# Patient Record
Sex: FEMALE | Race: BLACK OR AFRICAN AMERICAN
[De-identification: names, ages, dates, MRNs, and addresses within clinical notes are randomized per-mention and may not be internally consistent; named-entity substitution may affect disease eponyms.]

---

## 2019-04-20 ENCOUNTER — HOSPITAL ENCOUNTER (EMERGENCY)
Dept: HOSPITAL 72 - EMR | Age: 49
Discharge: HOME | End: 2019-04-20
Payer: MEDICAID

## 2019-04-20 VITALS — BODY MASS INDEX: 38.51 KG/M2 | WEIGHT: 260 LBS | HEIGHT: 69 IN

## 2019-04-20 VITALS — SYSTOLIC BLOOD PRESSURE: 115 MMHG | DIASTOLIC BLOOD PRESSURE: 78 MMHG

## 2019-04-20 VITALS — DIASTOLIC BLOOD PRESSURE: 80 MMHG | SYSTOLIC BLOOD PRESSURE: 121 MMHG

## 2019-04-20 VITALS — SYSTOLIC BLOOD PRESSURE: 121 MMHG | DIASTOLIC BLOOD PRESSURE: 69 MMHG

## 2019-04-20 DIAGNOSIS — F20.9: ICD-10-CM

## 2019-04-20 DIAGNOSIS — R00.2: Primary | ICD-10-CM

## 2019-04-20 DIAGNOSIS — F32.9: ICD-10-CM

## 2019-04-20 DIAGNOSIS — I10: ICD-10-CM

## 2019-04-20 DIAGNOSIS — R00.0: ICD-10-CM

## 2019-04-20 LAB
ADD MANUAL DIFF: NO
ALBUMIN SERPL-MCNC: 4.3 G/DL (ref 3.4–5)
ALBUMIN/GLOB SERPL: 1.1 {RATIO} (ref 1–2.7)
ALP SERPL-CCNC: 105 U/L (ref 46–116)
ALT SERPL-CCNC: 73 U/L (ref 12–78)
ANION GAP SERPL CALC-SCNC: 12 MMOL/L (ref 5–15)
APPEARANCE UR: CLEAR
APTT PPP: (no result) S
AST SERPL-CCNC: 43 U/L (ref 15–37)
BASOPHILS NFR BLD AUTO: 1.6 % (ref 0–2)
BILIRUB SERPL-MCNC: 0.5 MG/DL (ref 0.2–1)
BUN SERPL-MCNC: 19 MG/DL (ref 7–18)
CALCIUM SERPL-MCNC: 9.7 MG/DL (ref 8.5–10.1)
CHLORIDE SERPL-SCNC: 101 MMOL/L (ref 98–107)
CK MB SERPL-MCNC: 2.6 NG/ML (ref 0–3.6)
CK SERPL-CCNC: 258 U/L (ref 26–308)
CO2 SERPL-SCNC: 25 MMOL/L (ref 21–32)
CREAT SERPL-MCNC: 0.9 MG/DL (ref 0.55–1.3)
EOSINOPHIL NFR BLD AUTO: 2.2 % (ref 0–3)
ERYTHROCYTE [DISTWIDTH] IN BLOOD BY AUTOMATED COUNT: 13.1 % (ref 11.6–14.8)
GLOBULIN SER-MCNC: 4 G/DL
GLUCOSE UR STRIP-MCNC: NEGATIVE MG/DL
HCT VFR BLD CALC: 41.6 % (ref 37–47)
HGB BLD-MCNC: 14.4 G/DL (ref 12–16)
KETONES UR QL STRIP: NEGATIVE
LEUKOCYTE ESTERASE UR QL STRIP: NEGATIVE
LYMPHOCYTES NFR BLD AUTO: 23.2 % (ref 20–45)
MCV RBC AUTO: 82 FL (ref 80–99)
MONOCYTES NFR BLD AUTO: 7.5 % (ref 1–10)
NEUTROPHILS NFR BLD AUTO: 65.6 % (ref 45–75)
NITRITE UR QL STRIP: NEGATIVE
PH UR STRIP: 5 [PH] (ref 4.5–8)
PLATELET # BLD: 214 K/UL (ref 150–450)
POTASSIUM SERPL-SCNC: 4.5 MMOL/L (ref 3.5–5.1)
PROT UR QL STRIP: NEGATIVE
RBC # BLD AUTO: 5.07 M/UL (ref 4.2–5.4)
SODIUM SERPL-SCNC: 138 MMOL/L (ref 136–145)
SP GR UR STRIP: 1.01 (ref 1–1.03)
UROBILINOGEN UR-MCNC: NORMAL MG/DL (ref 0–1)
WBC # BLD AUTO: 6.8 K/UL (ref 4.8–10.8)

## 2019-04-20 PROCEDURE — 93005 ELECTROCARDIOGRAM TRACING: CPT

## 2019-04-20 PROCEDURE — 84439 ASSAY OF FREE THYROXINE: CPT

## 2019-04-20 PROCEDURE — 85025 COMPLETE CBC W/AUTO DIFF WBC: CPT

## 2019-04-20 PROCEDURE — 80307 DRUG TEST PRSMV CHEM ANLYZR: CPT

## 2019-04-20 PROCEDURE — 82553 CREATINE MB FRACTION: CPT

## 2019-04-20 PROCEDURE — 80053 COMPREHEN METABOLIC PANEL: CPT

## 2019-04-20 PROCEDURE — 82550 ASSAY OF CK (CPK): CPT

## 2019-04-20 PROCEDURE — 81003 URINALYSIS AUTO W/O SCOPE: CPT

## 2019-04-20 PROCEDURE — 84443 ASSAY THYROID STIM HORMONE: CPT

## 2019-04-20 PROCEDURE — 99284 EMERGENCY DEPT VISIT MOD MDM: CPT

## 2019-04-20 PROCEDURE — 71045 X-RAY EXAM CHEST 1 VIEW: CPT

## 2019-04-20 PROCEDURE — 36415 COLL VENOUS BLD VENIPUNCTURE: CPT

## 2019-04-20 PROCEDURE — 83690 ASSAY OF LIPASE: CPT

## 2019-04-20 PROCEDURE — 84484 ASSAY OF TROPONIN QUANT: CPT

## 2019-04-20 NOTE — EMERGENCY ROOM REPORT
History of Present Illness


General


Chief Complaint:  Palpitations


Source:  Patient





Present Illness


HPI


Patient present with complaints of palpitation sensation


She reports that she has had these for extended period time however today she 

felt more outpatient sensation she reports that her psychiatrist told her to 

get checked by her primary physician


This was in March


However she has not had a chance to





Patient also seeing her primary physician monthly





Denies any chest pain with it denies any vomiting or diarrhea





Denies any change in medications recently denies any fevers


Patient reports thyroid medical condition


Allergies:  


Coded Allergies:  


     NO KNOWN ALLERGIES (Unverified  Allergy, Unknown, 12/30/15)





Patient History


Past Medical History:  see triage record


Pertinent Family History:  none


Last Menstrual Period:  1 year ago


Reviewed Nursing Documentation:  PMH: Agreed; PSxH: Agreed





Nursing Documentation-PMH


Past Medical History:  No History, Except For


Hx Hypertension:  Yes


History Of Psychiatric Problem:  Yes - depression, schizophrenia





Review of Systems


All Other Systems:  negative except mentioned in HPI





Physical Exam





Vital Signs








  Date Time  Temp Pulse Resp B/P (MAP) Pulse Ox O2 Delivery O2 Flow Rate FiO2


 


4/20/19 10:14 97.0 120 18 131/86 99 Room Air  








Sp02 EP Interpretation:  reviewed, normal


General Appearance:  well appearing, no apparent distress


Head:  normocephalic, atraumatic


Eyes:  bilateral eye PERRL, bilateral eye EOMI


ENT:  hearing grossly normal, normal pharynx, TMs + canals normal, uvula midline


Neck:  full range of motion, supple, no meningismus, no bony tend


Respiratory:  lungs clear, normal breath sounds, no rhonchi, no respiratory 

distress, no retraction, no accessory muscle use


Cardiovascular #1:  normal peripheral pulses, no edema, no gallop, no JVD, no 

murmur, tachycardia - 110 beats a minute


Gastrointestinal:  normal bowel sounds, non tender, soft, no mass, no 

organomegaly, non-distended, no guarding, no hernia, no pulsatile mass, no 

rebound


Genitourinary:  no CVA tenderness


Musculoskeletal:  normal inspection


Neurologic:  oriented x3, responsive, CNs III-XII nml as tested, motor strength/

tone normal, sensory intact


Psychiatric:  mood/affect normal


Skin:  normal color, no rash, warm/dry, palpation normal


Lymphatic:  normal inspection, no adenopathy





Medical Decision Making


Diagnostic Impression:  


 Primary Impression:  


 Palpitations


ER Course


Given the patient's history and presentation multiple differentials and 

consideration





Patient's EKG does show a mildly tachycardic finding with heart rate of 107





Extensive blood work including thyroid studies and cardiac studies initiated





All within normal limits





Patient is on multiple medications


I feel that she would benefit from evaluation of the medications provided by 

psychiatry and primary physician to evaluate further possible interaction





Patient also could potentially benefit from altered monitoring and close 

outpatient follow-up





Labs








Test


  4/20/19


10:30


 


White Blood Count


  6.8 K/UL


(4.8-10.8)


 


Red Blood Count


  5.07 M/UL


(4.20-5.40)


 


Hemoglobin


  14.4 G/DL


(12.0-16.0)


 


Hematocrit


  41.6 %


(37.0-47.0)


 


Mean Corpuscular Volume 82 FL (80-99) 


 


Mean Corpuscular Hemoglobin


  28.4 PG


(27.0-31.0)


 


Mean Corpuscular Hemoglobin


Concent 34.5 G/DL


(32.0-36.0)


 


Red Cell Distribution Width


  13.1 %


(11.6-14.8)


 


Platelet Count


  214 K/UL


(150-450)


 


Mean Platelet Volume


  8.4 FL


(6.5-10.1)


 


Neutrophils (%) (Auto)


  65.6 %


(45.0-75.0)


 


Lymphocytes (%) (Auto)


  23.2 %


(20.0-45.0)


 


Monocytes (%) (Auto)


  7.5 %


(1.0-10.0)


 


Eosinophils (%) (Auto)


  2.2 %


(0.0-3.0)


 


Basophils (%) (Auto)


  1.6 %


(0.0-2.0)


 


Urine Color Pale yellow 


 


Urine Appearance Clear 


 


Urine pH 5 (4.5-8.0) 


 


Urine Specific Gravity


  1.010


(1.005-1.035)


 


Urine Protein


  Negative


(NEGATIVE)


 


Urine Glucose (UA)


  Negative


(NEGATIVE)


 


Urine Ketones


  Negative


(NEGATIVE)


 


Urine Blood


  Negative


(NEGATIVE)


 


Urine Nitrite


  Negative


(NEGATIVE)


 


Urine Bilirubin


  Negative


(NEGATIVE)


 


Urine Urobilinogen


  Normal MG/DL


(0.0-1.0)


 


Urine Leukocyte Esterase


  Negative


(NEGATIVE)


 


Sodium Level


  138 MMOL/L


(136-145)


 


Potassium Level


  4.5 MMOL/L


(3.5-5.1)


 


Chloride Level


  101 MMOL/L


()


 


Carbon Dioxide Level


  25 MMOL/L


(21-32)


 


Anion Gap


  12 mmol/L


(5-15)


 


Blood Urea Nitrogen


  19 mg/dL


(7-18)


 


Creatinine


  0.9 MG/DL


(0.55-1.30)


 


Estimat Glomerular Filtration


Rate > 60 mL/min


(>60)


 


Glucose Level


  107 MG/DL


()


 


Calcium Level


  9.7 MG/DL


(8.5-10.1)


 


Total Bilirubin


  0.5 MG/DL


(0.2-1.0)


 


Aspartate Amino Transf


(AST/SGOT) 43 U/L (15-37) 


 


 


Alanine Aminotransferase


(ALT/SGPT) 73 U/L (12-78) 


 


 


Alkaline Phosphatase


  105 U/L


()


 


Total Creatine Kinase


  258 U/L


()


 


Creatine Kinase MB


  2.6 NG/ML


(0.0-3.6)


 


Creatine Kinase MB Relative


Index 1.0 


 


 


Troponin I


  0.000 ng/mL


(0.000-0.056)


 


Total Protein


  8.3 G/DL


(6.4-8.2)


 


Albumin


  4.3 G/DL


(3.4-5.0)


 


Globulin 4.0 g/dL 


 


Albumin/Globulin Ratio 1.1 (1.0-2.7) 


 


Lipase


  132 U/L


()


 


Thyroid Stimulating Hormone


(TSH) < 10.000


uiU/mL


 


Free Thyroxine


  0.94 NG/DL


(0.76-1.46)


 


Urine Opiates Screen


  Positive


(NEGATIVE)


 


Urine Barbiturates Screen


  Negative


(NEGATIVE)


 


Phencyclidine (PCP) Screen


  Negative


(NEGATIVE)


 


Urine Amphetamines Screen


  Negative


(NEGATIVE)


 


Urine Benzodiazepines Screen


  Negative


(NEGATIVE)


 


Urine Cocaine Screen


  Negative


(NEGATIVE)


 


Urine Marijuana (THC) Screen


  Negative


(NEGATIVE)








EKG Diagnostic Results


Rate:  tachycardiac


Rhythm:  other


ST Segments:  no acute changes





Rhythm Strip Diag. Results


EP Interpretation:  yes


Rate:  98


Rhythm:  NSR, no PVC's, no ectopy





Chest X-Ray Diagnostic Results


Chest X-Ray Diagnostic Results :  


   Chest X-Ray Ordered:  Yes


   # of Views/Limited/Complete:  1 View


   Indication:  Chest Pain


   EP Interpretation:  Yes


   Interpretation:  no consolidation, no effusion, no pneumothorax


   Impression:  No acute disease


   Electronically Signed by:  Stephen Geiger DO





Last Vital Signs








  Date Time  Temp Pulse Resp B/P (MAP) Pulse Ox O2 Delivery O2 Flow Rate FiO2


 


4/20/19 10:14 97.0 120 18 131/86 99 Room Air  








Status:  improved


Disposition:  HOME, SELF-CARE


Condition:  Improved





Additional Instructions:  


Patient is provided with the discharge instructions notified to follow up with 

primary doctor in the next 2-3 days otherwise return to the er with any 

worsening symptoms.


Please note that this report is being documented using DRAGON technology.  This 

can lead to erroneous entry secondary to incorrect interpretation by the 

dictating instrument.











Stephen Geiger DO Apr 20, 2019 10:45

## 2019-09-08 ENCOUNTER — HOSPITAL ENCOUNTER (EMERGENCY)
Dept: HOSPITAL 72 - EMR | Age: 49
Discharge: HOME | End: 2019-09-08
Payer: MEDICAID

## 2019-09-08 VITALS — SYSTOLIC BLOOD PRESSURE: 132 MMHG | DIASTOLIC BLOOD PRESSURE: 82 MMHG

## 2019-09-08 VITALS — HEIGHT: 68.5 IN | WEIGHT: 260 LBS | BODY MASS INDEX: 38.95 KG/M2

## 2019-09-08 VITALS — SYSTOLIC BLOOD PRESSURE: 128 MMHG | DIASTOLIC BLOOD PRESSURE: 80 MMHG
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
## 2019-09-08 DIAGNOSIS — I10: ICD-10-CM

## 2019-09-08 DIAGNOSIS — S40.011A: Primary | ICD-10-CM

## 2019-09-08 PROCEDURE — 99283 EMERGENCY DEPT VISIT LOW MDM: CPT

## 2019-09-08 PROCEDURE — 73030 X-RAY EXAM OF SHOULDER: CPT

## 2019-09-08 NOTE — EMERGENCY ROOM REPORT
History of Present Illness


General


Chief Complaint:  Upper Extremity Injury


Source:  Patient, Medical Record





Present Illness


HPI


Patient is a 49-year-old female presenting for right-sided shoulder pain for 

the past 1 month.  She admits to falling onto the shoulder but does not 

remember how.  Pain has continued and is a 9 out of 10 dull ache.  Does not 

radiate.  Worse with movement.  She denies previous shoulder injury.  She has 

been taking ibuprofen which has not been helping.  She denies other symptoms 

including neck pain or stiffness, headache, dizziness, chest pain, shortness of 

breath, numbness or tingling


Allergies:  


Coded Allergies:  


     NO KNOWN ALLERGIES (Unverified  Allergy, Unknown, 12/30/15)





Patient History


Past Medical History:  see triage record


Pertinent Family History:  none


Last Menstrual Period:  several yrs ago


Reviewed Nursing Documentation:  PMH: Agreed; PSxH: Agreed





Nursing Documentation-PMH


Past Medical History:  No History, Except For


Hx Hypertension:  Yes





Review of Systems


All Other Systems:  negative except mentioned in HPI





Physical Exam





Vital Signs








  Date Time  Temp Pulse Resp B/P (MAP) Pulse Ox O2 Delivery O2 Flow Rate FiO2


 


9/8/19 12:13 98.1 105 18 132/82 (99) 96 Room Air  








Sp02 EP Interpretation:  reviewed, normal


General Appearance:  no apparent distress, alert, GCS 15, non-toxic


Head:  normocephalic, atraumatic


Neck:  full range of motion, no bony tend, supple/symm/no masses


Respiratory:  chest non-tender, lungs clear, normal breath sounds, speaking 

full sentences


Cardiovascular #1:  regular rate, rhythm, no edema


Musculoskeletal:  tender - R trapezius


Neurologic:  alert, oriented x3, responsive, motor strength/tone normal, 

sensory intact, speech normal


Psychiatric:  judgement/insight normal, memory normal, mood/affect normal, no 

suicidal/homicidal ideation


Skin:  no rash





Procedures


Splinting


Splinting :  


   Consent:  Verbal


   Location:  R arm sling


   Pre-Made Type:  sling


   Pre-Proc Neuro Vasc Exam:  normal


   Post-Proc Neuro Vasc Exam:  normal


   Patient Tolerated:  Well





Medical Decision Making


PA Attestation


Dr. Alvarado is my supervising physician. Patient management was discussed 

with my supervising physician


Diagnostic Impression:  


 Primary Impression:  


 Shoulder contusion


 Qualified Codes:  S40.011A - Contusion of right shoulder, initial encounter


ER Course


Patient is a 49-year-old female presenting for right-sided shoulder pain for 

the past 1 month.





Ddx considered include but not limited to sprain/strain, fracture, contusion





PE: Vitals stable. NAD


Neck: soft and supple. No midline tenderness or step-offs


R shoulder: No obvious deformity.  Full active range of motion is intact.  

Strength 5/5.


Tenderness to palpation over the right trapezius only





Right sided sling is placed in the patient is discharged home with ibuprofen.  

She is told to follow-up with primary doctor as soon as possible for further 

treatment


ER precautions given


Other X-Ray Diagnostic Results


Other X-Ray Diagnostic Results :  


   # of Views/Limited Vs Complete:  3 View, Complete


   Indication:  Pain


   EP Interpretation:  Yes


   PA Xray:  Interpretation reviewed, by supervising MD, and agrees with 

findings.


   Interpretation:  no dislocation, no soft tissue swelling, no fractures


   Impression:  No acute disease


   Electronically Signed by:  Milan Hsu PA-C





Last Vital Signs








  Date Time  Temp Pulse Resp B/P (MAP) Pulse Ox O2 Delivery O2 Flow Rate FiO2


 


9/8/19 13:11 98.0 100 14 128/80 97 Room Air  








Status:  improved


Disposition:  HOME, SELF-CARE


Condition:  Improved


Scripts


Ibuprofen* (MOTRIN*) 600 Mg Tablet


600 MG ORAL Q8H PRN for For Pain, #30 TAB 0 Refills


   Prov: MILAN HSU         9/8/19


Referrals:  


NON PHYSICIAN (PCP)


Patient Instructions:  Shoulder Pain, Contusion





Additional Instructions:  


I discussed my findings with the patient. All questions and concerns have been 

answered. Treatment and medication compliance have been addressed. I advised 

the patient that they need to follow up with PMD in 3-5 days. Return to ED if 

pain remains or worsens, numbness or tingling occurs, new rash is noticed, 

fever is noticed, or if needed for any reason. Patient verbalized understanding 

of discharge instructions.











MILAN HSU Sep 8, 2019 21:56

## 2019-09-08 NOTE — NUR
ER DISCHARGE NOTE:



Patient is cleared to be discharged per GAYLE Yates, pt is aox4, on room air, with 
stable vital signs. pt was given dc and prescription instructions, pt was able 
to verbalize understanding, pt id band removed without complications. pt is 
able to ambulate with steady gait. pt took all belongings.

## 2019-09-08 NOTE — NUR
ED Nurse Note:

pt walked in to ED due to right shoulder pain that radiated to right arm.  no 
limited ROM noted.  per pt, tripped and fell on concrete about month ago.  no 
head injury.  AAO x4.  respirations even and non-labored noted.  will wait for 
the further order.

## 2019-09-09 NOTE — DIAGNOSTIC IMAGING REPORT
Indication: Right shoulder pain

 

Technique: 3 views of the right shoulder

 

Comparison: none

 

Findings: No acute fractures. No dislocations. Joint spaces are preserved. There are

mild degenerative changes of the acromioclavicular joint

 

Impression: No acute process

## 2019-10-27 ENCOUNTER — HOSPITAL ENCOUNTER (EMERGENCY)
Dept: HOSPITAL 72 - EMR | Age: 49
Discharge: HOME | End: 2019-10-27
Payer: MEDICAID

## 2019-10-27 VITALS — BODY MASS INDEX: 38.21 KG/M2 | HEIGHT: 69 IN | WEIGHT: 258 LBS

## 2019-10-27 VITALS — DIASTOLIC BLOOD PRESSURE: 84 MMHG | SYSTOLIC BLOOD PRESSURE: 129 MMHG

## 2019-10-27 VITALS — DIASTOLIC BLOOD PRESSURE: 84 MMHG | SYSTOLIC BLOOD PRESSURE: 123 MMHG

## 2019-10-27 DIAGNOSIS — I10: ICD-10-CM

## 2019-10-27 DIAGNOSIS — M75.51: Primary | ICD-10-CM

## 2019-10-27 PROCEDURE — 96372 THER/PROPH/DIAG INJ SC/IM: CPT

## 2019-10-27 PROCEDURE — 99283 EMERGENCY DEPT VISIT LOW MDM: CPT

## 2019-10-27 NOTE — NUR
ED Nurse Note:



Patient walked in to ER from home due to Rt arm pain 10/10 radiates to Rt 
shoulder. Patient alert and oriented x4 and ambulatory. Skin clean and intact. 
Calm and cooperative. No acute distress noted at this moment. per pt, she has 
had this pain for a month but it got worse last night.

## 2019-10-27 NOTE — EMERGENCY ROOM REPORT
History of Present Illness


General


Chief Complaint:  Pain


Source:  Patient





Present Illness


HPI


49-year-old female who is morbidly obese and no other significant past medical 

history here complaining of chronic right shoulder pain x1 and half months.  

Patient was here at [facility] beginning of September 2019 after a fall and was 

diagnosed with shoulder contusion since x-ray was within normal limits.  

Patient did follow with primary care however has not mentioned the shoulder 

pain to him yet.  Reports that starting last night she noticed increasing pain 

rating a 10 out of 10 with radiation to right forearm and fingers.  Denies 

tingling and numbness.  Patient points to around the shoulder bursa when 

complains of pain.  No impingement sign is noted.  Patient has full range of 

motion with minimal pain.  Reports that she is right-handed and usually does a 

lot of housework.  Denies any recent fall or injury.  Has not taken any 

medication other than over-the-counter ibuprofen for pain relief.  Denies chest 

pain, chest pain radiation, shortness of breath, palpitation, headache and 

dizziness and other associated symptoms.


Allergies:  


Coded Allergies:  


     NO KNOWN ALLERGIES (Unverified  Allergy, Unknown, 12/30/15)





Patient History


Past Medical History:  see triage record


Past Surgical History:  unable to obtain


Pertinent Family History:  none


Last Menstrual Period:  no period


Pregnant Now:  No


Immunizations:  UTD


Reviewed Nursing Documentation:  PMH: Agreed; PSxH: Agreed





Nursing Documentation-PMH


Hx Hypertension:  Yes





Review of Systems


All Other Systems:  negative except mentioned in HPI





Physical Exam





Vital Signs








  Date Time  Temp Pulse Resp B/P (MAP) Pulse Ox O2 Delivery O2 Flow Rate FiO2


 


10/27/19 12:46 97.7 112 17 123/84 (97) 94 Room Air  








Sp02 EP Interpretation:  reviewed, normal


General Appearance:  no apparent distress, alert, GCS 15, non-toxic


Head:  normocephalic, atraumatic


Eyes:  bilateral eye normal inspection, bilateral eye PERRL


ENT:  hearing grossly normal, normal pharynx, no angioedema, normal voice


Neck:  full range of motion, supple, supple/symm/no masses


Respiratory:  chest non-tender, lungs clear, normal breath sounds, no rhonchi, 

no respiratory distress, no wheezing, speaking full sentences


Cardiovascular #1:  regular rate, rhythm, no edema, no murmur, normal capillary 

refill


Cardiovascular #2:  2+ radial (R), 2+ radial (L)


Gastrointestinal:  normal bowel sounds, non tender, soft, non-distended, no 

guarding, no rebound


Genitourinary:  no CVA tenderness


Musculoskeletal:  back normal, digits/nails normal, gait/station normal, normal 

range of motion, non-tender, no calf tenderness, pelvis stable, other - No 

impingement sign noted


Neurologic:  alert, oriented x3, responsive, motor strength/tone normal, 

sensory intact, speech normal


Psychiatric:  judgement/insight normal, memory normal, mood/affect normal, no 

suicidal/homicidal ideation


Skin:  no rash


Lymphatic:  no adenopathy





Medical Decision Making


PA Attestation


All my diagnosis and treatment plans were reviewed ad discussed with my 

supervising physician Dr. Davis


Diagnostic Impression:  


 Primary Impression:  


 Bursitis, shoulder


ER Course


49-year-old female who is morbidly obese and no other significant past medical 

history here complaining of chronic right shoulder pain x1 and half months.  

Patient was here at [facility] beginning of September 2019 after a fall and was 

diagnosed with shoulder contusion since x-ray was within normal limits.  

Patient did follow with primary care however has not mentioned the shoulder 

pain to him yet.  Reports that starting last night she noticed increasing pain 

rating a 10 out of 10 with radiation to right forearm and fingers.  Denies 

tingling and numbness.  Patient points to around the shoulder bursa when 

complains of pain.  No impingement sign is noted.  Patient has full range of 

motion with minimal pain.  Reports that she is right-handed and usually does a 

lot of housework.  Denies any recent fall or injury.  Has not taken any 

medication other than over-the-counter ibuprofen for pain relief.  Denies chest 

pain, chest pain radiation, shortness of breath, palpitation, headache and 

dizziness and other associated symptoms.





Ddx considered but are not limited to : Shoulder sprain versus strain versus 

fracture versus bursitis versus rotator cuff injury


Vital signs: are WNL, pt. is afebrile





H&PE are most consistent with: Bursitis of right shoulder





ORDERS: Ibuprofen, lidocaine patch, Robaxin





ED INTERVENTIONS: Toradol








DISCHARGE: At this time pt. is stable for d/c to home. Will provide printed 

patient care instructions, and any necessary prescriptions. Care plan and 

follow up instructions have been discussed with the patient prior to discharge.

  Advised patient to follow with primary care provider for referral to physical 

therapy and orthopedic specialist or possible MRI of the shoulder needed.  Low 

back pain and clinical presentation correlates with bursitis versus tendinitis.

  Treatment remains the same.  Patient agrees with the above treatment.





Last Vital Signs








  Date Time  Temp Pulse Resp B/P (MAP) Pulse Ox O2 Delivery O2 Flow Rate FiO2


 


10/27/19 13:00 97.7 105 17 123/84 94 Room Air  








Disposition:  HOME, SELF-CARE


Condition:  Stable


Scripts


Ibuprofen (Ibu) 800 Mg Tablet


800 MG PO TID, #21 TAB


   Prov: Yolanda Pitt         10/27/19 


Lidocaine Patch* (Lidoderm Patch*) 1 Each Adh..patch


1 PATCH TOPIC DAILY, #7 PATCH 0 Refills


   Patch(es) may remain in place for up to 12 hours in any 24-hour


   period.


   Prov: Yolanda Pitt         10/27/19 


Methocarbamol* (ROBAXIN-500*) 500 Mg Tablet


500 MG ORAL TID PRN for For Pain, #15 TAB 0 Refills


   Prov: Yolanda Pitt         10/27/19


Patient Instructions:  Bursitis, Easy-to-Read





Additional Instructions:  


Take medication will follow with your primary care provider for referral for 

physical therapy and orthopedic referral.  If worsening symptoms return to 

emergency room











Yolanda Pitt Oct 27, 2019 13:21